# Patient Record
Sex: FEMALE | Race: WHITE
[De-identification: names, ages, dates, MRNs, and addresses within clinical notes are randomized per-mention and may not be internally consistent; named-entity substitution may affect disease eponyms.]

---

## 2017-07-07 ENCOUNTER — HOSPITAL ENCOUNTER (OUTPATIENT)
Dept: HOSPITAL 80 - FIMAGING | Age: 57
End: 2017-07-07
Attending: FAMILY MEDICINE
Payer: COMMERCIAL

## 2017-07-07 DIAGNOSIS — Z12.31: Primary | ICD-10-CM

## 2017-07-07 PROCEDURE — G0202 SCR MAMMO BI INCL CAD: HCPCS

## 2017-08-26 ENCOUNTER — HOSPITAL ENCOUNTER (EMERGENCY)
Dept: HOSPITAL 80 - FED | Age: 57
Discharge: HOME | End: 2017-08-26
Payer: COMMERCIAL

## 2017-08-26 VITALS — RESPIRATION RATE: 16 BRPM | OXYGEN SATURATION: 98 %

## 2017-08-26 VITALS — HEART RATE: 81 BPM | TEMPERATURE: 98.6 F | DIASTOLIC BLOOD PRESSURE: 78 MMHG | SYSTOLIC BLOOD PRESSURE: 134 MMHG

## 2017-08-26 DIAGNOSIS — S52.502A: Primary | ICD-10-CM

## 2017-08-26 DIAGNOSIS — S52.612A: ICD-10-CM
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
## 2017-08-26 DIAGNOSIS — Z87.891: ICD-10-CM
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
## 2017-08-26 NOTE — EDPHY
H & P


Smoking Status: Former smoker


Time Seen by Provider: 08/26/17 11:47


HPI/ROS: 





CHIEF COMPLAINT:  Fall, left wrist injury





HISTORY OF PRESENT ILLNESS:  57-year-old female presents to the emergency 

department with injury to her left wrist.  The patient was at the garden and 

lost her balance and fell forward injuring her left wrist.  She did not hit her 

head or lose consciousness.  She denies neck or back pain.  Denies chest pain 

or difficulty breathing.  Denies abdominal pain.  She denies any presyncopal 

symptoms prior to her fall.  She is right-hand dominant.  She complains of 

isolated pain to the left wrist.  Denies pain in her left elbow or shoulder.





REVIEW OF SYSTEMS:


Constitutional:  No fever, no chills.


Eyes:  No double or blurry vision.


ENT:  No sore throat.


Respiratory:  No cough, no shortness of breath.


Cardiac:  No chest pain.


Gastrointestinal:  No abdominal pain, vomiting or diarrhea.


Genitourinary:  No dysuria.


Musculoskeletal:  No neck or back pain.


Skin:  No rashes.


Neurological:  No headache. (Ann Marie Franklina M)


Past Medical/Surgical History: 





Facial surgery, uterine ablation (Noemi,Allison M)


Social History: 





 and lives in [place] (Noemi,Allison M)


Physical Exam: 





General Appearance:  Alert, no distress.  No visible signs of trauma to her 

head.  She is mentating normally and answering questions appropriately.


Eyes:  Pupils equal and round.  Extraocular motions are all intact.


ENT:  Mouth:  Mucous membranes moist.


Respiratory:  No wheezing, rhonchi, or rales, lungs are clear to auscultation.


Cardiovascular:  Regular rate and rhythm.


Gastrointestinal:  Abdomen is soft and nontender, no masses, no rebound or 

guarding, bowel sounds normal.


Neurological:  Alert and oriented x 3, cranial nerves II through XII grossly 

intact


Skin:  Warm and dry, no rashes.


Musculoskeletal:  Nontender to palpate along the cervical, thoracic or lumbar 

spine.  Neck is supple.


Extremities:  Tenderness with palpation especially over the left wrist over the 

distal radius.  Unable to supinate secondary to pain.  She is able to wiggle 

her fingers.  Normal sensation to light touch with normal 2 point 

discrimination.  Strong radial pulse at the left wrist.  Nontender to palpate 

the left elbow or shoulder.  Full range of motion of the right upper extremity 

and lower extremities bilaterally.


Psychiatric:  Patient is oriented X 3, there is no agitation. (Allison Franklin)


Constitutional: 





 Initial Vital Signs











Temperature (C)  36.5 C   08/26/17 11:41


 


Heart Rate  72   08/26/17 11:41


 


Respiratory Rate  16   08/26/17 11:41


 


Blood Pressure  116/76   08/26/17 11:41


 


O2 Sat (%)  98   08/26/17 11:41








 











O2 Delivery Mode               Room Air














Allergies/Adverse Reactions: 


 





No Known Allergies Allergy (Unverified 07/01/14 12:19)


 








Home Medications: 














 Medication  Instructions  Recorded


 


Synthroid  08/26/17














Medical Decision Making





- Diagnostics


Imaging: I viewed and interpreted images myself





- Diagnostics


Imaging Results: 


X-rays of the left wrist reveal distal radius fracture as well as ulnar styloid 

fracture.  This is reviewed by myself the PAC system.  Radiology interpretation 

to follow. (Allison Franklin)


Procedures: 





Patient was placed in Ortho Glass sugar-tong splint and sling and examined post 

application in good placement with normal CNS. (Allison Franklin)


ED Course/Re-evaluation: 


57-year-old female presents to the emergency department with left wrist injury.

  X-rays reveal distal radius and ulnar fracture.  She was placed in a splint 

and given orthopedic referral.  I do not think reduction is necessary.  The 

patient has seen Dr. Sujey Walker in the past.  She was also given the name 

of Dr. Tommy Woods who is on-call for General Orthopedics. (Allison Franklin)





Did not see this patient while she was in the emergency department.  However 

her care was discussed with the PA while the patient was in the department.  I 

agree with treatment plan and management (Zuhair Obrien)


Differential Diagnosis: 





Including but not limited to fracture, dislocation, contusion, sprain (Allison Franklin)





Departure





- Departure


Disposition: Home, Routine, Self-Care


Clinical Impression: 


 Left wrist fracture





Condition: Good


Instructions:  Wrist Fracture in Adults (ED)


Additional Instructions: 


Keep splint on and keep it dry.  Ibuprofen 600 mg every 8 hours as needed for 

pain.  Follow up with orthopedic surgeon this week to recheck.  Ice and elevate 

to help minimize swelling.


Referrals: 


Sujey Walker MD [Medical Doctor] - As per Instructions


Tommy Woods MD [Medical Doctor] - As per Instructions (Orthopedic surgeon on-

call)

## 2018-07-13 ENCOUNTER — HOSPITAL ENCOUNTER (OUTPATIENT)
Dept: HOSPITAL 80 - FIMAGING | Age: 58
End: 2018-07-13
Payer: COMMERCIAL

## 2018-07-13 DIAGNOSIS — Z12.31: Primary | ICD-10-CM
